# Patient Record
Sex: MALE | Race: WHITE | NOT HISPANIC OR LATINO | Employment: FULL TIME | URBAN - METROPOLITAN AREA
[De-identification: names, ages, dates, MRNs, and addresses within clinical notes are randomized per-mention and may not be internally consistent; named-entity substitution may affect disease eponyms.]

---

## 2019-12-04 ENCOUNTER — HOSPITAL ENCOUNTER (EMERGENCY)
Facility: HOSPITAL | Age: 34
Discharge: HOME | End: 2019-12-04
Attending: EMERGENCY MEDICINE | Admitting: EMERGENCY MEDICINE

## 2019-12-04 ENCOUNTER — APPOINTMENT (OUTPATIENT)
Dept: CT IMAGING | Facility: HOSPITAL | Age: 34
End: 2019-12-04

## 2019-12-04 VITALS
HEIGHT: 76 IN | OXYGEN SATURATION: 100 % | RESPIRATION RATE: 18 BRPM | WEIGHT: 190 LBS | BODY MASS INDEX: 23.14 KG/M2 | TEMPERATURE: 98.9 F | HEART RATE: 110 BPM | SYSTOLIC BLOOD PRESSURE: 134 MMHG | DIASTOLIC BLOOD PRESSURE: 90 MMHG

## 2019-12-04 DIAGNOSIS — J01.40 ACUTE PANSINUSITIS, RECURRENCE NOT SPECIFIED: Primary | ICD-10-CM

## 2019-12-04 LAB
ALBUMIN SERPL-MCNC: 4.8 G/DL (ref 3.5–5.2)
ALBUMIN/GLOB SERPL: 1.4 G/DL
ALP SERPL-CCNC: 107 U/L (ref 39–117)
ALT SERPL W P-5'-P-CCNC: 29 U/L (ref 1–41)
ANION GAP SERPL CALCULATED.3IONS-SCNC: 13 MMOL/L (ref 5–15)
AST SERPL-CCNC: 27 U/L (ref 1–40)
BASOPHILS # BLD AUTO: 0.05 10*3/MM3 (ref 0–0.2)
BASOPHILS NFR BLD AUTO: 0.3 % (ref 0–1.5)
BILIRUB SERPL-MCNC: 0.3 MG/DL (ref 0.2–1.2)
BUN BLD-MCNC: 14 MG/DL (ref 6–20)
BUN/CREAT SERPL: 15.4 (ref 7–25)
CALCIUM SPEC-SCNC: 9.8 MG/DL (ref 8.6–10.5)
CHLORIDE SERPL-SCNC: 102 MMOL/L (ref 98–107)
CO2 SERPL-SCNC: 28 MMOL/L (ref 22–29)
CREAT BLD-MCNC: 0.91 MG/DL (ref 0.76–1.27)
DEPRECATED RDW RBC AUTO: 41 FL (ref 37–54)
EOSINOPHIL # BLD AUTO: 0.02 10*3/MM3 (ref 0–0.4)
EOSINOPHIL NFR BLD AUTO: 0.1 % (ref 0.3–6.2)
ERYTHROCYTE [DISTWIDTH] IN BLOOD BY AUTOMATED COUNT: 13.2 % (ref 12.3–15.4)
GFR SERPL CREATININE-BSD FRML MDRD: 97 ML/MIN/1.73
GLOBULIN UR ELPH-MCNC: 3.4 GM/DL
GLUCOSE BLD-MCNC: 106 MG/DL (ref 65–99)
HCT VFR BLD AUTO: 43.3 % (ref 37.5–51)
HGB BLD-MCNC: 15.1 G/DL (ref 13–17.7)
LYMPHOCYTES # BLD AUTO: 2.09 10*3/MM3 (ref 0.7–3.1)
LYMPHOCYTES NFR BLD AUTO: 13.3 % (ref 19.6–45.3)
MCH RBC QN AUTO: 29.7 PG (ref 26.6–33)
MCHC RBC AUTO-ENTMCNC: 34.9 G/DL (ref 31.5–35.7)
MCV RBC AUTO: 85.2 FL (ref 79–97)
MONOCYTES # BLD AUTO: 1.06 10*3/MM3 (ref 0.1–0.9)
MONOCYTES NFR BLD AUTO: 6.8 % (ref 5–12)
NEUTROPHILS # BLD AUTO: 12.39 10*3/MM3 (ref 1.7–7)
NEUTROPHILS NFR BLD AUTO: 78.9 % (ref 42.7–76)
PLATELET # BLD AUTO: 241 10*3/MM3 (ref 140–450)
PMV BLD AUTO: 10.5 FL (ref 6–12)
POTASSIUM BLD-SCNC: 3.9 MMOL/L (ref 3.5–5.2)
PROT SERPL-MCNC: 8.2 G/DL (ref 6–8.5)
RBC # BLD AUTO: 5.08 10*6/MM3 (ref 4.14–5.8)
SODIUM BLD-SCNC: 143 MMOL/L (ref 136–145)
WBC NRBC COR # BLD: 15.7 10*3/MM3 (ref 3.4–10.8)

## 2019-12-04 PROCEDURE — 93010 ELECTROCARDIOGRAM REPORT: CPT | Performed by: INTERNAL MEDICINE

## 2019-12-04 PROCEDURE — 70450 CT HEAD/BRAIN W/O DYE: CPT

## 2019-12-04 PROCEDURE — 80053 COMPREHEN METABOLIC PANEL: CPT | Performed by: NURSE PRACTITIONER

## 2019-12-04 PROCEDURE — 93005 ELECTROCARDIOGRAM TRACING: CPT | Performed by: NURSE PRACTITIONER

## 2019-12-04 PROCEDURE — 85025 COMPLETE CBC W/AUTO DIFF WBC: CPT | Performed by: NURSE PRACTITIONER

## 2019-12-04 PROCEDURE — 99283 EMERGENCY DEPT VISIT LOW MDM: CPT

## 2019-12-04 RX ORDER — FLUTICASONE PROPIONATE 50 MCG
2 SPRAY, SUSPENSION (ML) NASAL DAILY
COMMUNITY

## 2019-12-04 RX ORDER — AMOXICILLIN 500 MG/1
1000 CAPSULE ORAL 3 TIMES DAILY
Qty: 30 CAPSULE | Refills: 0 | Status: SHIPPED | OUTPATIENT
Start: 2019-12-04

## 2019-12-04 RX ORDER — SODIUM CHLORIDE 0.9 % (FLUSH) 0.9 %
10 SYRINGE (ML) INJECTION AS NEEDED
Status: DISCONTINUED | OUTPATIENT
Start: 2019-12-04 | End: 2019-12-04 | Stop reason: HOSPADM

## 2019-12-04 RX ADMIN — SODIUM CHLORIDE 1000 ML: 9 INJECTION, SOLUTION INTRAVENOUS at 08:43

## 2019-12-04 NOTE — ED PROVIDER NOTES
"  EMERGENCY DEPARTMENT ENCOUNTER    CHIEF COMPLAINT  Chief Complaint: headache  History given by:Pt  History limited by:none  Time Seen: 0759  Room Number: 22/22  PMD: Gurpreet Nazario MD      HPI:  Pt is a 33 y.o. male who presents with \"pulsing\" posterior headache that began yesterday, worsening this morning when bending down to tie his shoes. Pt states his headache is currently a 1/10 but will become a 5-7/10 when tilting his head forward. He also reports sinus congestion for the past month for which he has been taking various OTC medications for. Yesterday, pt states he developed ear pain and sinus pressure. He was seen at  then and was told his ears and throat looked normal and was told it could be sinus related. Patient denies any fever, chills, vision changes, photophobia, vomiting, or focal weakness.   Past Medical History- he denies any significant past medical history      PAST MEDICAL HISTORY  Active Ambulatory Problems     Diagnosis Date Noted   • No Active Ambulatory Problems     Resolved Ambulatory Problems     Diagnosis Date Noted   • No Resolved Ambulatory Problems     No Additional Past Medical History       PAST SURGICAL HISTORY  No past surgical history on file.    FAMILY HISTORY  No family history on file.    SOCIAL HISTORY  Social History     Socioeconomic History   • Marital status:      Spouse name: Not on file   • Number of children: Not on file   • Years of education: Not on file   • Highest education level: Not on file   Tobacco Use   • Smoking status: Never Smoker   • Smokeless tobacco: Never Used   Substance and Sexual Activity   • Drug use: No   • Sexual activity: Defer         ALLERGIES  Patient has no known allergies.    REVIEW OF SYSTEMS  Review of Systems   Constitutional: Negative for chills and fever.   HENT: Positive for ear pain and sinus pressure. Negative for sore throat.    Gastrointestinal: Negative for nausea and vomiting.   Genitourinary: Negative for dysuria. "   Musculoskeletal: Negative for back pain.   Skin: Negative for rash.   Neurological: Positive for headaches.   Psychiatric/Behavioral: The patient is not nervous/anxious.        PHYSICAL EXAM  ED Triage Vitals   Temp Heart Rate Resp BP SpO2   12/04/19 0715 12/04/19 0715 12/04/19 0750 12/04/19 0750 12/04/19 0715   98.9 °F (37.2 °C) 110 18 158/92 99 %       Physical Exam   Constitutional: He is well-developed, well-nourished, and in no distress. No distress.   HENT:   Head: Normocephalic and atraumatic.   Right Ear: Tympanic membrane normal.   Left Ear: Tympanic membrane normal.   Mouth/Throat: Oropharynx is clear and moist and mucous membranes are normal. No oropharyngeal exudate.   Eyes: EOM are normal. Pupils are equal, round, and reactive to light. No scleral icterus.   Neck: Normal range of motion.   Cardiovascular: Normal rate, regular rhythm and normal heart sounds.   Pulmonary/Chest: Effort normal and breath sounds normal. No respiratory distress. He has no wheezes. He has no rales.   Abdominal: Soft. Bowel sounds are normal. He exhibits no distension. There is no tenderness.   Musculoskeletal: Normal range of motion.   Neurological: He is alert.   Skin: Skin is warm and dry.   Psychiatric: Mood and affect normal.   Nursing note and vitals reviewed.      LAB RESULTS  Recent Results (from the past 24 hour(s))   Comprehensive Metabolic Panel    Collection Time: 12/04/19  8:42 AM   Result Value Ref Range    Glucose 106 (H) 65 - 99 mg/dL    BUN 14 6 - 20 mg/dL    Creatinine 0.91 0.76 - 1.27 mg/dL    Sodium 143 136 - 145 mmol/L    Potassium 3.9 3.5 - 5.2 mmol/L    Chloride 102 98 - 107 mmol/L    CO2 28.0 22.0 - 29.0 mmol/L    Calcium 9.8 8.6 - 10.5 mg/dL    Total Protein 8.2 6.0 - 8.5 g/dL    Albumin 4.80 3.50 - 5.20 g/dL    ALT (SGPT) 29 1 - 41 U/L    AST (SGOT) 27 1 - 40 U/L    Alkaline Phosphatase 107 39 - 117 U/L    Total Bilirubin 0.3 0.2 - 1.2 mg/dL    eGFR Non African Amer 97 >60 mL/min/1.73    Globulin 3.4  gm/dL    A/G Ratio 1.4 g/dL    BUN/Creatinine Ratio 15.4 7.0 - 25.0    Anion Gap 13.0 5.0 - 15.0 mmol/L   CBC Auto Differential    Collection Time: 12/04/19  8:42 AM   Result Value Ref Range    WBC 15.70 (H) 3.40 - 10.80 10*3/mm3    RBC 5.08 4.14 - 5.80 10*6/mm3    Hemoglobin 15.1 13.0 - 17.7 g/dL    Hematocrit 43.3 37.5 - 51.0 %    MCV 85.2 79.0 - 97.0 fL    MCH 29.7 26.6 - 33.0 pg    MCHC 34.9 31.5 - 35.7 g/dL    RDW 13.2 12.3 - 15.4 %    RDW-SD 41.0 37.0 - 54.0 fl    MPV 10.5 6.0 - 12.0 fL    Platelets 241 140 - 450 10*3/mm3    Neutrophil % 78.9 (H) 42.7 - 76.0 %    Lymphocyte % 13.3 (L) 19.6 - 45.3 %    Monocyte % 6.8 5.0 - 12.0 %    Eosinophil % 0.1 (L) 0.3 - 6.2 %    Basophil % 0.3 0.0 - 1.5 %    Neutrophils, Absolute 12.39 (H) 1.70 - 7.00 10*3/mm3    Lymphocytes, Absolute 2.09 0.70 - 3.10 10*3/mm3    Monocytes, Absolute 1.06 (H) 0.10 - 0.90 10*3/mm3    Eosinophils, Absolute 0.02 0.00 - 0.40 10*3/mm3    Basophils, Absolute 0.05 0.00 - 0.20 10*3/mm3       I ordered the above labs and reviewed the results    RADIOLOGY  CT Head Without Contrast   Preliminary Result   Paranasal sinus disease as described. There is no evidence   of acute infarction or hemorrhage. Further evaluation could be performed   with MRI examination of brain as indicated.       The above information was called to and discussed with Nely Aguiar.               Radiation dose reduction techniques were utilized, including automated   exposure control and exposure modulation based on body size.                  I ordered the above noted radiological studies and reviewed the images on the PACS system.  Spoke with Dr. Field regarding CT scan results        EKG    ekg was interpreted by Dr. Guallpa, see Dr. Guallpa's note for interpretation.        PROGRESS AND CONSULTS       1000 Reviewed pt's history and workup with Dr. Guallpa.  At bedside evaluation, they agree with the plan of care.    1020 Rechecked pt. Pt is resting comfortably in  NAD. Notified pt of test results, including CT head that is concerning for sinusitis otherwise unremarkable. Discussed the plan to discharge the pt home with prescriptions for Abx. The pt is already taking Claritan. Advised pt to take Mucinex and to f/u with his PCP. Pt understands and agrees with the plan, all questions answered.    Reviewed implications of results, diagnosis, meds, responsibility to follow up, warning signs and symptoms of possible worsening, potential complications and reasons to return to ER with patient.  Discussed all results and noted any abnormalities with patient.  Discussed absolute need to recheck abnormalities with his PCP.    Discussed plan for discharge, as there is no emergent indication for admission.  Pt is agreeable and understands need for follow up and repeat testing.  Pt is aware that discharge does not mean that nothing is wrong but it indicates no emergency is present.  Pt is discharged with instructions to follow up with primary care doctor to have their blood pressure rechecked.       DIAGNOSIS  Final diagnoses:   Acute pansinusitis, recurrence not specified       FOLLOW UP   Gurpreet Nazario MD  [address] [id number] [phone]    Schedule an appointment as soon as possible for a visit   As needed      RX     Medication List      New Prescriptions    amoxicillin 500 MG capsule  Commonly known as:  AMOXIL  Take 2 capsules by mouth 3 (Three) Times a Day.                    COURSE & MEDICAL DECISION MAKING  Pertinent Labs and Imaging studies that were ordered and reviewed are noted above.  Results were reviewed/discussed with the patient and they were also made aware of online assess.   Pt also made aware that some labs, such as cultures, will not be resulted during ER visit and follow up with PMD is necessary.     MEDICATIONS GIVEN IN ER  Medications   sodium chloride 0.9 % flush 10 mL (not administered)   sodium chloride 0.9 % bolus 1,000 mL (0  "mL Intravenous Stopped 12/4/19 0900)       /92 (BP Location: Right arm, Patient Position: Sitting)   Pulse 110   Temp 98.9 °F (37.2 °C) (Tympanic)   Resp 18   Ht 193 cm (76\")   Wt 86.2 kg (190 lb)   SpO2 100%   BMI 23.13 kg/m²       I personally reviewed the past medical history, past surgical history, social history, family history, current medications and allergies as they appear in this chart.  The scribe's note accurately reflects the work and decisions made by me.     Documentation assistance provided by nora Castle for TA Miller on 12/4/2019 at 10:33 AM. Information recorded by the scribe was done at my direction and has been verified and validated by me.          Dakota Castle  12/04/19 1033       Dakota Castle  12/04/19 1034       Nely Aguiar APRN  12/04/19 1038    "

## 2019-12-04 NOTE — DISCHARGE INSTRUCTIONS
Medications as ordered  Over-the-counter decongestant  PMD as needed  Return to the ER for worsening headache, visual changes, dizziness or any new or worsening symptoms

## 2019-12-04 NOTE — ED PROVIDER NOTES
"MD ATTESTATION NOTE    Patient is a 33 y.o. male who presents to the ED with complaint of a posterior headache that started yesterday. The patient reports the headache has progressively worsened since the onset. The patient describes the headache as a \"pulsating\" sensation. The patient reports the headache is exacerbated with bending his head forward towards his chest. The patient denies that the headache is exacerbated with palpation. The patient denies associated nausea, vomiting, fever, chills, cough, or sinus pressure. The patient denies hx of headaches in the past.    Limited Physical Exam:  Constitutional: awake, alert, no acute distress, well appearing  HENT: oropharynx is clear and moist   Neck: no meningeal signs, no lymphadenopathy   Cardiovascular: regular rhythm, mild tachycardia  Pulmonary/Chest: normal effort, lungs are CTAB  Abdominal: soft  Neurological: alert, moves all extremities, follows commands  Skin: warm, dry  Vital signs and nursing notes reviewed.    Procedures:  EKG          EKG time: 0810  Rhythm/Rate: sinus tachycardia, 110  P waves and WV: LAE, nml WV interval  QRS, axis: nml; nml   ST and T waves: nml; nml     Interpreted Contemporaneously by me, independently viewed  No previous EKG for comparison.    Workup reviewed. WBC is elevated at 15.70. Plan is to check a CT Head for further evaluation.    The RENETTA and I have discussed this patient's history, physical exam, and treatment plan.  I have reviewed the documentation and personally had a face to face interaction with the patient. I affirm the documentation and agree with the treatment and plan.  The attached note describes my personal findings.    Documentation assistance provided by nora Martinez for Dr. Ramu MD.  Information recorded by the nora was done at my direction and has been verified and validated by me.       Evelyn Martinez  12/04/19 7446       Nelson Guallpa MD  12/04/19 0961    "

## 2019-12-04 NOTE — ED NOTES
Patient to er with c/o headache. Patient was seen at urgent care last night. Patient stated the pain is getting worse.      Josh Renee RN  12/04/19 0723